# Patient Record
Sex: MALE | Employment: OTHER | URBAN - METROPOLITAN AREA
[De-identification: names, ages, dates, MRNs, and addresses within clinical notes are randomized per-mention and may not be internally consistent; named-entity substitution may affect disease eponyms.]

---

## 2020-12-02 DIAGNOSIS — M25.551 HIP PAIN, RIGHT: Primary | ICD-10-CM

## 2020-12-03 ENCOUNTER — ANCILLARY PROCEDURE (OUTPATIENT)
Dept: GENERAL RADIOLOGY | Facility: CLINIC | Age: 85
End: 2020-12-03
Payer: MEDICAID

## 2020-12-03 ENCOUNTER — ANCILLARY PROCEDURE (OUTPATIENT)
Dept: CT IMAGING | Facility: CLINIC | Age: 85
End: 2020-12-03
Attending: PREVENTIVE MEDICINE
Payer: MEDICAID

## 2020-12-03 ENCOUNTER — OFFICE VISIT (OUTPATIENT)
Dept: ORTHOPEDICS | Facility: CLINIC | Age: 85
End: 2020-12-03
Payer: MEDICAID

## 2020-12-03 VITALS
BODY MASS INDEX: 20.73 KG/M2 | HEIGHT: 69 IN | DIASTOLIC BLOOD PRESSURE: 77 MMHG | HEART RATE: 86 BPM | SYSTOLIC BLOOD PRESSURE: 146 MMHG | WEIGHT: 140 LBS

## 2020-12-03 DIAGNOSIS — S72.101A CLOSED FRACTURE OF TROCHANTER OF RIGHT FEMUR, INITIAL ENCOUNTER (H): Primary | ICD-10-CM

## 2020-12-03 DIAGNOSIS — M25.551 HIP PAIN, RIGHT: ICD-10-CM

## 2020-12-03 DIAGNOSIS — S72.101A CLOSED FRACTURE OF TROCHANTER OF RIGHT FEMUR, INITIAL ENCOUNTER (H): ICD-10-CM

## 2020-12-03 PROCEDURE — 99204 OFFICE O/P NEW MOD 45 MIN: CPT | Performed by: PREVENTIVE MEDICINE

## 2020-12-03 PROCEDURE — 73502 X-RAY EXAM HIP UNI 2-3 VIEWS: CPT | Mod: RT | Performed by: RADIOLOGY

## 2020-12-03 PROCEDURE — 73700 CT LOWER EXTREMITY W/O DYE: CPT | Mod: RT | Performed by: RADIOLOGY

## 2020-12-03 RX ORDER — TRAMADOL HYDROCHLORIDE 50 MG/1
50 TABLET ORAL EVERY 6 HOURS PRN
Qty: 15 TABLET | Refills: 0 | Status: CANCELLED | OUTPATIENT
Start: 2020-12-03 | End: 2020-12-18

## 2020-12-03 RX ORDER — LISINOPRIL 5 MG/1
5 TABLET ORAL DAILY
COMMUNITY

## 2020-12-03 RX ORDER — LOSARTAN POTASSIUM 25 MG/1
25 TABLET ORAL DAILY
COMMUNITY

## 2020-12-03 RX ORDER — TRAMADOL HYDROCHLORIDE 50 MG/1
50 TABLET ORAL 3 TIMES DAILY PRN
Qty: 15 TABLET | Refills: 0 | Status: SHIPPED | OUTPATIENT
Start: 2020-12-03 | End: 2020-12-18

## 2020-12-03 RX ORDER — GLIPIZIDE 5 MG/1
5 TABLET ORAL
COMMUNITY

## 2020-12-03 ASSESSMENT — MIFFLIN-ST. JEOR: SCORE: 1270.42

## 2020-12-03 ASSESSMENT — PAIN SCALES - GENERAL: PAINLEVEL: EXTREME PAIN (8)

## 2020-12-03 NOTE — PATIENT INSTRUCTIONS
Thanks for coming today.  [facility]  [address] [place] [address]    To schedule future appointments in Ortho Clinic, you may call [phone].    To schedule ordered imaging by your provider:   Call [facility] [phone]    To schedule an injection ordered by your provider:  Call [facility] Injection scheduling line: [phone]  [facility] [employer] available online at:  [email or web address]    Please call if any further questions or concerns ([phone]).  Clinic hours 8 am to 5 pm.    Return to clinic (call) if symptoms worsen or fail to improve.

## 2020-12-03 NOTE — PROGRESS NOTES
"NEW PATIENT INTAKE QUESTIONNAIRE  [facility] 12/3/2020      Sheila S Sehlley chief complaint for this visit includes:  Chief Complaint   Patient presents with     Hip Pain     Right hip pain from fall yesterday      PCP: Fabrice Taveras    Referring Provider:  No referring provider defined for this encounter.    BP (!) 146/77   Pulse 86   Ht 1.753 m (5' 9\")   Wt 63.5 kg (140 lb)   BMI 20.67 kg/m    Extreme Pain (8)     Do you need any medication refills at today's visit? No      Reason for Visit:    What part of your body is injured / painful?  right hip    What caused the injury /pain? Fall    How long ago did your injury occur or pain begin? yesterday    What are your most bothersome symptoms? Pain, Inability to compete/participate in sport or activity and Inability to perform ADLs    How would you characterize your symptom? aching, stabbing and sharp    What makes your symptoms better? Rest    What makes your symptoms worse? Movement    Have you been previously seen for this problem? No    Medical History:    Medical History: Reviewed     Have you had surgery on this body part before? N/A    Medications: Reviewed and updated today     Allergies: No known drug allergies      Review of Systems:    Have you recently had a a fever, chills, weight loss? No    Do you have any vision problems? No    Do you have any chest pain or edema? No    Do you have any shortness of breath or wheezing?  No    Do you have stomach problems? No    Do you have any urinary track issues? No    Do you have any numbness or focal weakness? No    Do you have diabetes? No    Do you have problems with bleeding or clotting? No    Do you have an rashes or other skin lesions? No    Communication:    Who else should know about this visit? Other: Family Member    "

## 2020-12-03 NOTE — LETTER
12/3/2020         RE: Sheila Taveras  [address]  [place] [address]        Dear Colleague,    Thank you for referring your patient, Sheila Taveras, to the [facility] [place]. Please see a copy of my visit note below.    HISTORY OF PRESENT ILLNESS  Mr. Taveras is a pleasant 89 year old year old male who presents to clinic today with right hip injury, suspected fracture  Sheila's grandson translates  dialect to help the patient explains that he fell while walking yesterday  Has had pain with weight bearing and movement of the hip since then  Location: right hip  Quality:  achy pain    Severity: 8/10 at worst    Duration: 1 day  Timing: occurs intermittently  Context: occurs while weight bearing  Modifying factors:  resting and non-use makes it better, movement and use makes it worse  Associated signs & symptoms: some swelling lateral hip    MEDICAL HISTORY  There is no problem list on file for this patient.      No current outpatient medications on file.       Not on File    No family history on file.  Social History     Socioeconomic History     Marital status:      Spouse name: Not on file     Number of children: Not on file     Years of education: Not on file     Highest education level: Not on file   Occupational History     Not on file   Social Needs     Financial resource strain: Not on file     Food insecurity     Worry: Not on file     Inability: Not on file     Transportation needs     Medical: Not on file     Non-medical: Not on file   Tobacco Use     Smoking status: Not on file   Substance and Sexual Activity     Alcohol use: Not on file     Drug use: Not on file     Sexual activity: Not on file   Lifestyle     Physical activity     Days per week: Not on file     Minutes per session: Not on file     Stress: Not on file   Relationships     Social connections     Talks on phone: Not on file     Gets together: Not on file     Attends  Zoroastrian service: Not on file     Active member of club or organization: Not on file     Attends meetings of clubs or organizations: Not on file     Relationship status: Not on file     Intimate partner violence     Fear of current or ex partner: Not on file     Emotionally abused: Not on file     Physically abused: Not on file     Forced sexual activity: Not on file   Other Topics Concern     Not on file   Social History Narrative     Not on file       Additional medical/Social/Surgical histories reviewed in [facility] and updated as appropriate.     REVIEW OF SYSTEMS (12/3/2020)  10 point ROS of systems including Constitutional, Eyes, Respiratory, Cardiovascular, Gastroenterology, Genitourinary, Integumentary, Musculoskeletal, Psychiatric, Allergic/Immunologic were all negative except for pertinent positives noted in my HPI.     PHYSICAL EXAM  VSS, reviewed, stable  General  - normal appearance, in no obvious distress, in wheelchair  HEENT  - conjunctivae not injected, moist mucous membranes, normocephalic/atraumatic head, ears normal appearance, no lesions, mouth normal appearance, no scars, normal dentition and teeth present  CV  - normal femoral pulse  Pulm  - normal respiratory pattern, non-labored  Musculoskeletal - right hip  - stance: unable to ambulate due to pain, no obvious leg length discrepancy  - inspection: no swelling or effusion,  normal bone and joint alignment, no obvious deformity  - palpation: has lateral hip tenderness  - ROM: limited flexion and internal rotation secondary to pain, pain with passive and active ROM   - strength: 5/5 in all planes    Neuro  - no sensory or motor deficit, grossly normal coordination, normal muscle tone  Skin  - no ecchymosis, erythema, warmth, or induration, no obvious rash  Psych  - interactive, appropriate, normal mood and affect  ASSESSMENT & PLAN  89 yo male with right hip trochanteric fracture  Reviewed hip xrays: shows trochanteric nondisplaced  "fracture  Reviewed hip CT we were able to obtain this morning and shows trochanteric fracture as described  Cont. Wheelchair  Recommend lidocaine patch  Tramadol PRN  Ice PRN  F/u with ortho in 1-2 weeks sooner if condition of pain worsens  Limited weight bearing and recommend use of wheelchair at all times  Discussed plan with grandson and patient    Appropriate PPE was utilized for prevention of spread of Covid-19.  John Baptiste MD, CAQSM      NEW PATIENT INTAKE QUESTIONNAIRE  [facility] 12/3/2020      Sheila Rosa chief complaint for this visit includes:  Chief Complaint   Patient presents with     Hip Pain     Right hip pain from fall yesterday      PCP: Fabrice Taveras    Referring Provider:  No referring provider defined for this encounter.    BP (!) 146/77   Pulse 86   Ht 1.753 m (5' 9\")   Wt 63.5 kg (140 lb)   BMI 20.67 kg/m    Extreme Pain (8)     Do you need any medication refills at today's visit? No      Reason for Visit:    What part of your body is injured / painful?  right hip    What caused the injury /pain? Fall    How long ago did your injury occur or pain begin? yesterday    What are your most bothersome symptoms? Pain, Inability to compete/participate in sport or activity and Inability to perform ADLs    How would you characterize your symptom? aching, stabbing and sharp    What makes your symptoms better? Rest    What makes your symptoms worse? Movement    Have you been previously seen for this problem? No    Medical History:    Medical History: Reviewed     Have you had surgery on this body part before? N/A    Medications: Reviewed and updated today     Allergies: No known drug allergies      Review of Systems:    Have you recently had a a fever, chills, weight loss? No    Do you have any vision problems? No    Do you have any chest pain or edema? No    Do you have any shortness of breath or wheezing?  No    Do you have stomach problems? No    Do you have any " urinary track issues? No    Do you have any numbness or focal weakness? No    Do you have diabetes? No    Do you have problems with bleeding or clotting? No    Do you have an rashes or other skin lesions? No    Communication:    Who else should know about this visit? Other: Family Member        Again, thank you for allowing me to participate in the care of your patient.        Sincerely,        John Baptiste MD

## 2020-12-03 NOTE — PROGRESS NOTES
HISTORY OF PRESENT ILLNESS  Mr. Taveras is a pleasant 89 year old year old male who presents to clinic today with right hip injury, suspected fracture  Sheila's grandson translates [place] dialect to help the patient explains that he fell while walking yesterday  Has had pain with weight bearing and movement of the hip since then  Location: right hip  Quality:  achy pain    Severity: 8/10 at worst    Duration: 1 day  Timing: occurs intermittently  Context: occurs while weight bearing  Modifying factors:  resting and non-use makes it better, movement and use makes it worse  Associated signs & symptoms: some swelling lateral hip    MEDICAL HISTORY  There is no problem list on file for this patient.      No current outpatient medications on file.       Not on File    No family history on file.  Social History     Socioeconomic History     Marital status:      Spouse name: Not on file     Number of children: Not on file     Years of education: Not on file     Highest education level: Not on file   Occupational History     Not on file   Social Needs     Financial resource strain: Not on file     Food insecurity     Worry: Not on file     Inability: Not on file     Transportation needs     Medical: Not on file     Non-medical: Not on file   Tobacco Use     Smoking status: Not on file   Substance and Sexual Activity     Alcohol use: Not on file     Drug use: Not on file     Sexual activity: Not on file   Lifestyle     Physical activity     Days per week: Not on file     Minutes per session: Not on file     Stress: Not on file   Relationships     Social connections     Talks on phone: Not on file     Gets together: Not on file     Attends Zoroastrian service: Not on file     Active member of club or organization: Not on file     Attends meetings of clubs or organizations: Not on file     Relationship status: Not on file     Intimate partner violence     Fear of current or ex partner: Not on file     Emotionally abused:  Not on file     Physically abused: Not on file     Forced sexual activity: Not on file   Other Topics Concern     Not on file   Social History Narrative     Not on file       Additional medical/Social/Surgical histories reviewed in [facility] and updated as appropriate.     REVIEW OF SYSTEMS (12/3/2020)  10 point ROS of systems including Constitutional, Eyes, Respiratory, Cardiovascular, Gastroenterology, Genitourinary, Integumentary, Musculoskeletal, Psychiatric, Allergic/Immunologic were all negative except for pertinent positives noted in my HPI.     PHYSICAL EXAM  VSS, reviewed, stable  General  - normal appearance, in no obvious distress, in wheelchair  HEENT  - conjunctivae not injected, moist mucous membranes, normocephalic/atraumatic head, ears normal appearance, no lesions, mouth normal appearance, no scars, normal dentition and teeth present  CV  - normal femoral pulse  Pulm  - normal respiratory pattern, non-labored  Musculoskeletal - right hip  - stance: unable to ambulate due to pain, no obvious leg length discrepancy  - inspection: no swelling or effusion,  normal bone and joint alignment, no obvious deformity  - palpation: has lateral hip tenderness  - ROM: limited flexion and internal rotation secondary to pain, pain with passive and active ROM   - strength: 5/5 in all planes    Neuro  - no sensory or motor deficit, grossly normal coordination, normal muscle tone  Skin  - no ecchymosis, erythema, warmth, or induration, no obvious rash  Psych  - interactive, appropriate, normal mood and affect  ASSESSMENT & PLAN  89 yo male with right hip trochanteric fracture  Reviewed hip xrays: shows trochanteric nondisplaced fracture  Reviewed hip CT we were able to obtain this morning and shows trochanteric fracture as described  Cont. Wheelchair  Recommend lidocaine patch  Tramadol PRN  Ice PRN  F/u with ortho in 1-2 weeks sooner if condition of pain worsens  Limited weight bearing and recommend use of wheelchair at  all times  Discussed plan with grandson and patient    Appropriate PPE was utilized for prevention of spread of Covid-19.  John Baptiste MD, CAQSM